# Patient Record
Sex: FEMALE | Race: WHITE | Employment: UNEMPLOYED | URBAN - METROPOLITAN AREA
[De-identification: names, ages, dates, MRNs, and addresses within clinical notes are randomized per-mention and may not be internally consistent; named-entity substitution may affect disease eponyms.]

---

## 2019-12-30 ENCOUNTER — ANESTHESIA (OUTPATIENT)
Dept: ENDOSCOPY | Age: 59
End: 2019-12-30
Payer: COMMERCIAL

## 2019-12-30 ENCOUNTER — HOSPITAL ENCOUNTER (OUTPATIENT)
Age: 59
Setting detail: OUTPATIENT SURGERY
Discharge: HOME | End: 2019-12-30
Attending: INTERNAL MEDICINE | Admitting: INTERNAL MEDICINE
Payer: COMMERCIAL

## 2019-12-30 ENCOUNTER — ANESTHESIA EVENT (OUTPATIENT)
Dept: ENDOSCOPY | Age: 59
End: 2019-12-30
Payer: COMMERCIAL

## 2019-12-30 VITALS
OXYGEN SATURATION: 98 % | SYSTOLIC BLOOD PRESSURE: 130 MMHG | TEMPERATURE: 98 F | BODY MASS INDEX: 51.53 KG/M2 | WEIGHT: 280 LBS | RESPIRATION RATE: 11 BRPM | HEART RATE: 57 BPM | HEIGHT: 62 IN | DIASTOLIC BLOOD PRESSURE: 85 MMHG

## 2019-12-30 PROCEDURE — 76060000031 HC ANESTHESIA FIRST 0.5 HR: Performed by: INTERNAL MEDICINE

## 2019-12-30 PROCEDURE — 76040000019: Performed by: INTERNAL MEDICINE

## 2019-12-30 PROCEDURE — 74011000250 HC RX REV CODE- 250: Performed by: NURSE ANESTHETIST, CERTIFIED REGISTERED

## 2019-12-30 PROCEDURE — 74011250637 HC RX REV CODE- 250/637: Performed by: INTERNAL MEDICINE

## 2019-12-30 PROCEDURE — 77030009426 HC FCPS BIOP ENDOSC BSC -B: Performed by: INTERNAL MEDICINE

## 2019-12-30 PROCEDURE — 88305 TISSUE EXAM BY PATHOLOGIST: CPT

## 2019-12-30 PROCEDURE — 74011250636 HC RX REV CODE- 250/636: Performed by: NURSE ANESTHETIST, CERTIFIED REGISTERED

## 2019-12-30 RX ORDER — SODIUM CHLORIDE 0.9 % (FLUSH) 0.9 %
5-40 SYRINGE (ML) INJECTION AS NEEDED
Status: DISCONTINUED | OUTPATIENT
Start: 2019-12-30 | End: 2019-12-30 | Stop reason: HOSPADM

## 2019-12-30 RX ORDER — LOSARTAN POTASSIUM AND HYDROCHLOROTHIAZIDE 25; 100 MG/1; MG/1
1 TABLET ORAL DAILY
COMMUNITY

## 2019-12-30 RX ORDER — SODIUM CHLORIDE 9 MG/ML
INJECTION, SOLUTION INTRAVENOUS
Status: DISCONTINUED | OUTPATIENT
Start: 2019-12-30 | End: 2019-12-30 | Stop reason: HOSPADM

## 2019-12-30 RX ORDER — DEXTROMETHORPHAN/PSEUDOEPHED 2.5-7.5/.8
1.2 DROPS ORAL
Status: DISCONTINUED | OUTPATIENT
Start: 2019-12-30 | End: 2019-12-30 | Stop reason: HOSPADM

## 2019-12-30 RX ORDER — FLUOXETINE HYDROCHLORIDE 20 MG/1
20 CAPSULE ORAL DAILY
COMMUNITY

## 2019-12-30 RX ORDER — SODIUM CHLORIDE 9 MG/ML
50 INJECTION, SOLUTION INTRAVENOUS CONTINUOUS
Status: DISCONTINUED | OUTPATIENT
Start: 2019-12-30 | End: 2019-12-30 | Stop reason: HOSPADM

## 2019-12-30 RX ORDER — FENTANYL CITRATE 50 UG/ML
25-200 INJECTION, SOLUTION INTRAMUSCULAR; INTRAVENOUS
Status: DISCONTINUED | OUTPATIENT
Start: 2019-12-30 | End: 2019-12-30 | Stop reason: HOSPADM

## 2019-12-30 RX ORDER — FLUMAZENIL 0.1 MG/ML
0.2 INJECTION INTRAVENOUS
Status: DISCONTINUED | OUTPATIENT
Start: 2019-12-30 | End: 2019-12-30 | Stop reason: HOSPADM

## 2019-12-30 RX ORDER — NALOXONE HYDROCHLORIDE 0.4 MG/ML
0.4 INJECTION, SOLUTION INTRAMUSCULAR; INTRAVENOUS; SUBCUTANEOUS
Status: DISCONTINUED | OUTPATIENT
Start: 2019-12-30 | End: 2019-12-30 | Stop reason: HOSPADM

## 2019-12-30 RX ORDER — LIDOCAINE HYDROCHLORIDE 20 MG/ML
INJECTION, SOLUTION EPIDURAL; INFILTRATION; INTRACAUDAL; PERINEURAL AS NEEDED
Status: DISCONTINUED | OUTPATIENT
Start: 2019-12-30 | End: 2019-12-30 | Stop reason: HOSPADM

## 2019-12-30 RX ORDER — PROPOFOL 10 MG/ML
INJECTION, EMULSION INTRAVENOUS AS NEEDED
Status: DISCONTINUED | OUTPATIENT
Start: 2019-12-30 | End: 2019-12-30 | Stop reason: HOSPADM

## 2019-12-30 RX ORDER — SODIUM CHLORIDE 0.9 % (FLUSH) 0.9 %
5-40 SYRINGE (ML) INJECTION EVERY 8 HOURS
Status: DISCONTINUED | OUTPATIENT
Start: 2019-12-30 | End: 2019-12-30 | Stop reason: HOSPADM

## 2019-12-30 RX ORDER — ATROPINE SULFATE 0.1 MG/ML
0.5 INJECTION INTRAVENOUS
Status: DISCONTINUED | OUTPATIENT
Start: 2019-12-30 | End: 2019-12-30 | Stop reason: HOSPADM

## 2019-12-30 RX ORDER — METFORMIN HYDROCHLORIDE 500 MG/1
TABLET ORAL 2 TIMES DAILY WITH MEALS
COMMUNITY

## 2019-12-30 RX ORDER — EPINEPHRINE 0.1 MG/ML
1 INJECTION INTRACARDIAC; INTRAVENOUS
Status: DISCONTINUED | OUTPATIENT
Start: 2019-12-30 | End: 2019-12-30 | Stop reason: HOSPADM

## 2019-12-30 RX ORDER — MIDAZOLAM HYDROCHLORIDE 1 MG/ML
.25-5 INJECTION, SOLUTION INTRAMUSCULAR; INTRAVENOUS
Status: DISCONTINUED | OUTPATIENT
Start: 2019-12-30 | End: 2019-12-30 | Stop reason: HOSPADM

## 2019-12-30 RX ADMIN — PROPOFOL 30 MG: 10 INJECTION, EMULSION INTRAVENOUS at 13:09

## 2019-12-30 RX ADMIN — SODIUM CHLORIDE: 900 INJECTION, SOLUTION INTRAVENOUS at 12:59

## 2019-12-30 RX ADMIN — PROPOFOL 100 MG: 10 INJECTION, EMULSION INTRAVENOUS at 12:59

## 2019-12-30 RX ADMIN — PROPOFOL 30 MG: 10 INJECTION, EMULSION INTRAVENOUS at 13:12

## 2019-12-30 RX ADMIN — LIDOCAINE HYDROCHLORIDE 100 MG: 20 INJECTION, SOLUTION EPIDURAL; INFILTRATION; INTRACAUDAL; PERINEURAL at 12:59

## 2019-12-30 RX ADMIN — PROPOFOL 30 MG: 10 INJECTION, EMULSION INTRAVENOUS at 13:15

## 2019-12-30 RX ADMIN — PROPOFOL 40 MG: 10 INJECTION, EMULSION INTRAVENOUS at 13:04

## 2019-12-30 RX ADMIN — PROPOFOL 30 MG: 10 INJECTION, EMULSION INTRAVENOUS at 13:05

## 2019-12-30 RX ADMIN — PROPOFOL 40 MG: 10 INJECTION, EMULSION INTRAVENOUS at 13:02

## 2019-12-30 NOTE — PERIOP NOTES
Spoke at length with sonali and she stated she was previously but is not currently suicidal. She did contract for safety and promised to ask for help before acting on any feelings.  was in the room and agreed that patient has previously been suicidal but is currently under the care of a provider and not currently suicidal.      Dr. Ledy Chawla and Dr. Meyer Basim aware of previous suicide ideations.

## 2019-12-30 NOTE — H&P
[address]  [address] [facility], [address] [place] [address]      History and Physical       NAME:  Yaz Dangelo   :   1960   MRN:   [id number]             History of Present Illness:  Patient is a 64 y.o. who is seen for screening colonoscopy. PMH:  Past Medical History:   Diagnosis Date    Cancer ([facility] [address]     cervical,skin    Depression     Diabetes ([facility] [address]     Edema     GERD (gastroesophageal reflux disease)     Hiatal hernia     HTN (hypertension)     Obesity     Panic attacks     Sleep apnea     CPAP       PSH:  Past Surgical History:   Procedure Laterality Date    HX BLADDER SUSPENSION      HX GI      colonoscopy    HX OTHER SURGICAL      per patient CT Heart scan CAC Score 0 (8 years ago?)    HX TONSILLECTOMY      HX TUBAL LIGATION         Allergies: Allergies   Allergen Reactions    Amoxicillin Hives    Biaxin [Clarithromycin] Hives    Ceclor [Cefaclor] Rash    Crestor [Rosuvastatin] Myalgia    Erythromycin Rash    Lisinopril Rash     AND COUGH    Other Medication Cough     ARBS, CLARITIN-D-PALPS    Prednisone Other (comments)     Rapid HB    Prevacid Naprapac [Lansoprazole-Naproxen] Diarrhea    Sulfa (Sulfonamide Antibiotics) Rash    Tetracycline Nausea and Vomiting       Home Medications:  Prior to Admission Medications   Prescriptions Last Dose Informant Patient Reported? Taking? FLUoxetine (PROZAC) 20 mg capsule 2019 at Unknown time  Yes Yes   Sig: Take 20 mg by mouth daily. IBUPROFEN (MOTRIN PO) 2019 at Unknown time  Yes Yes   Sig: Take  by mouth.   levothyroxine (SYNTHROID) 75 mcg tablet 2019 at Unknown time  Yes Yes   Sig: Take  by mouth Daily (before breakfast). losartan-hydroCHLOROthiazide (HYZAAR) 100-25 mg per tablet 2019 at Unknown time  Yes Yes   Sig: Take 1 Tab by mouth daily. metFORMIN (GLUCOPHAGE) 500 mg tablet 2019 at Unknown time  Yes Yes   Sig: Take  by mouth two (2) times daily (with meals). omeprazole (PRILOSEC) 40 mg capsule 12/29/2019 at Unknown time  Yes Yes   Sig: Take 40 mg by mouth daily.       Facility-Administered Medications: None       Hospital Medications:  Current Facility-Administered Medications   Medication Dose Route Frequency    0.9% sodium chloride infusion  50 mL/hr IntraVENous CONTINUOUS    sodium chloride (NS) flush 5-40 mL  5-40 mL IntraVENous Q8H    sodium chloride (NS) flush 5-40 mL  5-40 mL IntraVENous PRN    midazolam (VERSED) injection 0.25-5 mg  0.25-5 mg IntraVENous Multiple    fentaNYL citrate (PF) injection  mcg   mcg IntraVENous Multiple    naloxone (NARCAN) injection 0.4 mg  0.4 mg IntraVENous Multiple    flumazenil (ROMAZICON) 0.1 mg/mL injection 0.2 mg  0.2 mg IntraVENous Multiple    simethicone (MYLICON) 40AZ/6.9KB oral drops 80 mg  1.2 mL Oral Multiple    atropine injection 0.5 mg  0.5 mg IntraVENous ONCE PRN    EPINEPHrine (ADRENALIN) 0.1 mg/mL syringe 1 mg  1 mg Endoscopically ONCE PRN       Social History:  Social History     Tobacco Use    Smoking status: Current Every Day Smoker    Smokeless tobacco: Never Used   Substance Use Topics    Alcohol use: Yes       Family History:  Family History   Problem Relation Age of Onset    COPD Mother     Heart Surgery Father     COPD Father     Heart Attack Brother     Stroke Brother              Review of Systems:      Constitutional: negative fever, negative chills, negative weight loss  Eyes:   negative visual changes  ENT:   negative sore throat, tongue or lip swelling  Respiratory:  negative cough, negative dyspnea  Cards:  negative for chest pain, palpitations, lower extremity edema  GI:   See HPI  :  negative for frequency, dysuria  Integument:  negative for rash and pruritus  Heme:  negative for easy bruising and gum/nose bleeding  Musculoskel: negative for myalgias,  back pain and muscle weakness  Neuro: negative for headaches, dizziness, vertigo  Psych:  negative for feelings of anxiety, depression       Objective:     Patient Vitals for the past 8 hrs:   Height Weight   12/30/19 1204 5' 2\" (1.575 m) 127 kg (280 lb)     No intake/output data recorded. No intake/output data recorded. EXAM:     NEURO-a&o   HEENT-wnl   LUNGS-clear    COR-regular rate and rhythym     ABD-soft , no tenderness, no rebound, good bs     EXT-no edema     Data Review     No results for input(s): WBC, HGB, HCT, PLT, HGBEXT, HCTEXT, PLTEXT in the last 72 hours. No results for input(s): NA, K, CL, CO2, BUN, CREA, GLU, PHOS, CA in the last 72 hours. No results for input(s): SGOT, GPT, AP, TBIL, TP, ALB, GLOB, GGT, AML, LPSE in the last 72 hours. No lab exists for component: AMYP, HLPSE  No results for input(s): INR, PTP, APTT, INREXT in the last 72 hours.        Assessment:   · Screening colonoscopy      Patient Active Problem List   Diagnosis Code    Cancer ([facility] [address] C80.1    Diabetes ([facility] [address] E11.9    Sleep apnea G47.30    Obesity E66.9    HTN (hypertension) I10    Depression F32.9           Plan:   · Endoscopic procedure with sedation     Signed By: Katy Jerez MD     12/30/2019  12:45 PM

## 2019-12-30 NOTE — ROUTINE PROCESS
Carlee Barbosa  1960  [id number]    Situation:  Verbal report received from: Vinie Friday  Procedure: Procedure(s):  COLONOSCOPY  ENDOSCOPIC POLYPECTOMY    Background:    Preoperative diagnosis: PERSONAL HX OF POLYPS  Postoperative diagnosis: 1. Diverticulosis  2. Sigmoid polyps    :  Dr. Arlene Max  Assistant(s): Endoscopy Technician-1: Lynnette Bender IV  Endoscopy RN-1: Brandon Carmen RN    Specimens:   ID Type Source Tests Collected by Time Destination   1 : sigmoid polyps Preservative   Donald Hill MD 12/30/2019 1313 Pathology     H. Pylori  no    Assessment:  Intra-procedure medications     Anesthesia gave intra-procedure sedation and medications, see anesthesia flow sheet yes    Intravenous fluids: NS@ KVO     Vital signs stable     Abdominal assessment: round and soft     Recommendation:  Discharge patient per MD order.   Family or Friend   Permission to share finding with family or friend yes

## 2019-12-30 NOTE — PROCEDURES
[address], [address] [place] [address]      Colonoscopy Operative Report    Mendel Can  [id number]  1960      Procedure Type:   Colonoscopy with polypectomy (hot biopsy)     Indications:    Screening colonoscopy   Date of last colonoscopy: 10 years ago, Polyps  Yes    Pre-operative Diagnosis: see indication above    Post-operative Diagnosis:  See findings below    :  Chris Pollock MD    Surgical Assistant: None    Implants:  None    Referring Provider: Herminio Adame MD      Sedation:  MAC anesthesia Propofol      Procedure Details:  After informed consent was obtained with all risks and benefits of procedure explained and preoperative exam completed, the patient was taken to the endoscopy suite and placed in the left lateral decubitus position. Upon sequential sedation as per above, a digital rectal exam was performed demonstrating internal hemorrhoids. The Olympus videocolonoscope  was inserted in the rectum and carefully advanced to the cecum, which was identified by the ileocecal valve and appendiceal orifice. The cecum was identified by the ileocecal valve and appendiceal orifice. The quality of preparation was good. The colonoscope was slowly withdrawn with careful evaluation between folds. Retroflexion in the rectum was completed . Findings:   Rectum: normal  Sigmoid: mild diverticulosis  2 polyps around 8 mm each removed by hot biopsies  Descending Colon: normal  Transverse Colon: normal  Ascending Colon: normal  Cecum: normal        Specimen Removed:  see findings    Complications: None. EBL:  None. Impression:    see findings    Recommendations: --Await pathology.       Recommendation for next colonscopy in 5 versus 10 years  High fiber diet    Signed By: Chris Pollock MD     12/30/2019  1:19 PM

## 2019-12-30 NOTE — ANESTHESIA PREPROCEDURE EVALUATION
Relevant Problems   No relevant active problems       Anesthetic History   No history of anesthetic complications            Review of Systems / Medical History  Patient summary reviewed, nursing notes reviewed and pertinent labs reviewed    Pulmonary        Sleep apnea: CPAP           Neuro/Psych         Psychiatric history     Cardiovascular    Hypertension                   GI/Hepatic/Renal     GERD           Endo/Other    Diabetes    Morbid obesity     Other Findings              Physical Exam    Airway  Mallampati: II  TM Distance: > 6 cm  Neck ROM: normal range of motion   Mouth opening: Normal     Cardiovascular  Regular rate and rhythm,  S1 and S2 normal,  no murmur, click, rub, or gallop             Dental  No notable dental hx       Pulmonary  Breath sounds clear to auscultation               Abdominal  GI exam deferred       Other Findings            Anesthetic Plan    ASA: 3  Anesthesia type: MAC            Anesthetic plan and risks discussed with: Patient

## 2019-12-30 NOTE — PROGRESS NOTES

## 2019-12-30 NOTE — ANESTHESIA POSTPROCEDURE EVALUATION
Post-Anesthesia Evaluation and Assessment    Patient: Maurice Cohen MRN: [id number]  SSN: [id number]    YOB: 1960  Age: 64 y.o. Sex: female      I have evaluated the patient and they are stable and ready for discharge from the PACU. Cardiovascular Function/Vital Signs  Visit Vitals  /57   Pulse 67   Temp 37.1 °C (98.7 °F)   Resp 14   Ht 5' 2\" (1.575 m)   Wt 127 kg (280 lb)   SpO2 98%   Breastfeeding No   BMI 51.21 kg/m²       Patient is status post MAC anesthesia for Procedure(s):  COLONOSCOPY  ENDOSCOPIC POLYPECTOMY. Nausea/Vomiting: None    Postoperative hydration reviewed and adequate. Pain:  Pain Scale 1: Numeric (0 - 10) (12/30/19 1255)  Pain Intensity 1: 0 (12/30/19 1255)   Managed    Neurological Status: At baseline    Mental Status, Level of Consciousness: Alert and  oriented to person, place, and time    Pulmonary Status:   O2 Device: Nasal cannula (12/30/19 1319)   Adequate oxygenation and airway patent    Complications related to anesthesia: None    Post-anesthesia assessment completed. No concerns    Signed By: Vicente Weiner MD     December 30, 2019              Procedure(s):  COLONOSCOPY  ENDOSCOPIC POLYPECTOMY.     MAC    <BSHSIANPOST>    Vitals Value Taken Time   /57 12/30/2019  1:19 PM   Temp     Pulse 67 12/30/2019  1:19 PM   Resp 14 12/30/2019  1:19 PM   SpO2 98 % 12/30/2019  1:19 PM

## 2019-12-30 NOTE — DISCHARGE INSTRUCTIONS
[facility]    COLON DISCHARGE INSTRUCTIONS    Tim Rico  [id number]  1960    Discomfort:  Redness at IV site- apply warm compress to area; if redness or soreness persist- contact your physician  There may be a slight amount of blood passed from the rectum  Gaseous discomfort- walking, belching will help relieve any discomfort  You may not operate a vehicle for 12 hours  You may not engage in an occupation involving machinery or appliances for rest of today  You may not drink alcoholic beverages for at least 12 hours  Avoid making any critical decisions for at least 24 hour  DIET:  You may resume your regular diet - however -  remember your colon is empty and a heavy meal will produce gas. Avoid these foods:  vegetables, fried / greasy foods, carbonated drinks     ACTIVITY:  You may  resume your normal daily activities it is recommended that you spend the remainder of the day resting -  avoid any strenuous activity. CALL M.D.   ANY SIGN OF:   Increasing pain, nausea, vomiting  Abdominal distension (swelling)  New increased bleeding (oral or rectal)  Fever (chills)  Pain in chest area  Bloody discharge from nose or mouth  Shortness of breath      Follow-up Instructions:   Call Dr. Nelida Awad for any questions or problems at [phone]   High fiber diet          ENDOSCOPY FINDINGS:   Your colonoscopy showed 2 small polyps removed and mild diverticulosis, rest of exam was normal.  Telephone # [phone]      Signed By: Nelida Awad MD     12/30/2019  1:21 PM       DISCHARGE SUMMARY from Nurse    The following personal items collected during your admission are returned to you:   Dental Appliance: Dental Appliances: None  Vision: Visual Aid: Glasses  Hearing Aid:    Jewelry:    Clothing:    Other Valuables:    Valuables sent to safe:

## 2019-12-30 NOTE — PERIOP NOTES
Called pcp cathie davila @ [phone]  Spoke to young  Pt has an appt in 2 weeks. Provided my cell for office to call me but if necessary but verified that patient is under Dr Lucas Factor care, has an up coming appointment, and verified office is aware of known suicide thoughts in recent past, in medication for depression.

## 2025-04-03 ENCOUNTER — TRANSCRIBE ORDERS (OUTPATIENT)
Facility: HOSPITAL | Age: 65
End: 2025-04-03

## 2025-04-03 DIAGNOSIS — Z86.73 HX OF TRANSIENT ISCHEMIC ATTACK (TIA): Primary | ICD-10-CM

## 2025-04-09 ENCOUNTER — CLINICAL DOCUMENTATION (OUTPATIENT)
Age: 65
End: 2025-04-09

## 2025-04-09 NOTE — PROGRESS NOTES
4/9/25 1053am: Records received from Addison Ramirez MD at [facility]. Dx: Cirrhosis and elevated LFT's. Routine appt. Records placed in Anisha's box

## 2025-04-23 ENCOUNTER — HOSPITAL ENCOUNTER (OUTPATIENT)
Dept: VASCULAR SURGERY | Facility: HOSPITAL | Age: 65
Discharge: HOME | End: 2025-04-25
Payer: COMMERCIAL

## 2025-04-23 DIAGNOSIS — Z86.73 HX OF TRANSIENT ISCHEMIC ATTACK (TIA): ICD-10-CM

## 2025-04-23 PROCEDURE — 93880 EXTRACRANIAL BILAT STUDY: CPT

## 2025-04-24 LAB
VAS LEFT CCA DIST EDV: 18.1 CM/S
VAS LEFT CCA DIST PSV: 65.6 CM/S
VAS LEFT CCA PROX EDV: 17.3 CM/S
VAS LEFT CCA PROX PSV: 72.2 CM/S
VAS LEFT ECA EDV: 10.8 CM/S
VAS LEFT ECA PSV: 87.5 CM/S
VAS LEFT ICA DIST EDV: 19.9 CM/S
VAS LEFT ICA DIST PSV: 60.1 CM/S
VAS LEFT ICA MID EDV: 18.1 CM/S
VAS LEFT ICA MID PSV: 67.4 CM/S
VAS LEFT ICA PROX EDV: 21.7 CM/S
VAS LEFT ICA PROX PSV: 67.4 CM/S
VAS LEFT ICA/CCA PSV: 1 NO UNITS
VAS LEFT SUBCLAVIAN PROX EDV: 0 CM/S
VAS LEFT SUBCLAVIAN PROX PSV: 103 CM/S
VAS LEFT VERTEBRAL EDV: 17.9 CM/S
VAS LEFT VERTEBRAL PSV: 38.6 CM/S
VAS RIGHT CCA DIST EDV: 22 CM/S
VAS RIGHT CCA DIST PSV: 67.4 CM/S
VAS RIGHT CCA PROX EDV: 25.7 CM/S
VAS RIGHT CCA PROX PSV: 93 CM/S
VAS RIGHT ECA EDV: 12.2 CM/S
VAS RIGHT ECA PSV: 61.3 CM/S
VAS RIGHT ICA DIST EDV: 19.1 CM/S
VAS RIGHT ICA DIST PSV: 50.1 CM/S
VAS RIGHT ICA MID EDV: 28.1 CM/S
VAS RIGHT ICA MID PSV: 68.6 CM/S
VAS RIGHT ICA PROX EDV: 15.8 CM/S
VAS RIGHT ICA PROX PSV: 46.5 CM/S
VAS RIGHT ICA/CCA PSV: 1 NO UNITS
VAS RIGHT SUBCLAVIAN PROX EDV: 0 CM/S
VAS RIGHT SUBCLAVIAN PROX PSV: 150.1 CM/S
VAS RIGHT VERTEBRAL EDV: 0 CM/S
VAS RIGHT VERTEBRAL PSV: 28.5 CM/S

## (undated) DEVICE — REM POLYHESIVE ADULT PATIENT RETURN ELECTRODE: Brand: VALLEYLAB

## (undated) DEVICE — FCPS BX HOT RJ4 2.2MMX240CM -- RADIAL JAW 4 BX/40